# Patient Record
Sex: FEMALE | NOT HISPANIC OR LATINO | URBAN - METROPOLITAN AREA
[De-identification: names, ages, dates, MRNs, and addresses within clinical notes are randomized per-mention and may not be internally consistent; named-entity substitution may affect disease eponyms.]

---

## 2021-12-04 ENCOUNTER — EMERGENCY (EMERGENCY)
Facility: HOSPITAL | Age: 21
LOS: 1 days | Discharge: HOME | End: 2021-12-04
Attending: EMERGENCY MEDICINE | Admitting: EMERGENCY MEDICINE
Payer: COMMERCIAL

## 2021-12-04 VITALS
OXYGEN SATURATION: 97 % | WEIGHT: 114.64 LBS | HEIGHT: 64 IN | DIASTOLIC BLOOD PRESSURE: 82 MMHG | SYSTOLIC BLOOD PRESSURE: 134 MMHG | TEMPERATURE: 100 F | HEART RATE: 134 BPM | RESPIRATION RATE: 17 BRPM

## 2021-12-04 VITALS
SYSTOLIC BLOOD PRESSURE: 137 MMHG | DIASTOLIC BLOOD PRESSURE: 79 MMHG | HEART RATE: 104 BPM | OXYGEN SATURATION: 96 % | RESPIRATION RATE: 18 BRPM | TEMPERATURE: 99 F

## 2021-12-04 DIAGNOSIS — J10.00 INFLUENZA DUE TO OTHER IDENTIFIED INFLUENZA VIRUS WITH UNSPECIFIED TYPE OF PNEUMONIA: ICD-10-CM

## 2021-12-04 DIAGNOSIS — Z20.822 CONTACT WITH AND (SUSPECTED) EXPOSURE TO COVID-19: ICD-10-CM

## 2021-12-04 DIAGNOSIS — L51.9 ERYTHEMA MULTIFORME, UNSPECIFIED: ICD-10-CM

## 2021-12-04 DIAGNOSIS — L50.9 URTICARIA, UNSPECIFIED: ICD-10-CM

## 2021-12-04 LAB
ANION GAP SERPL CALC-SCNC: 8 MMOL/L — LOW (ref 9–16)
BASOPHILS # BLD AUTO: 0.02 K/UL — SIGNIFICANT CHANGE UP (ref 0–0.2)
BASOPHILS NFR BLD AUTO: 0.4 % — SIGNIFICANT CHANGE UP (ref 0–2)
BUN SERPL-MCNC: 19 MG/DL — SIGNIFICANT CHANGE UP (ref 7–23)
CALCIUM SERPL-MCNC: 9 MG/DL — SIGNIFICANT CHANGE UP (ref 8.5–10.5)
CHLORIDE SERPL-SCNC: 104 MMOL/L — SIGNIFICANT CHANGE UP (ref 96–108)
CO2 SERPL-SCNC: 24 MMOL/L — SIGNIFICANT CHANGE UP (ref 22–31)
CREAT SERPL-MCNC: 0.85 MG/DL — SIGNIFICANT CHANGE UP (ref 0.5–1.3)
EOSINOPHIL # BLD AUTO: 0.11 K/UL — SIGNIFICANT CHANGE UP (ref 0–0.5)
EOSINOPHIL NFR BLD AUTO: 2 % — SIGNIFICANT CHANGE UP (ref 0–6)
FLUAV H1 2009 PAND RNA SPEC QL NAA+PROBE: DETECTED
GLUCOSE SERPL-MCNC: 116 MG/DL — HIGH (ref 70–99)
HCG UR QL: NEGATIVE — SIGNIFICANT CHANGE UP
HCT VFR BLD CALC: 44.3 % — SIGNIFICANT CHANGE UP (ref 34.5–45)
HGB BLD-MCNC: 14.3 G/DL — SIGNIFICANT CHANGE UP (ref 11.5–15.5)
IMM GRANULOCYTES NFR BLD AUTO: 0.4 % — SIGNIFICANT CHANGE UP (ref 0–1.5)
LYMPHOCYTES # BLD AUTO: 0.68 K/UL — LOW (ref 1–3.3)
LYMPHOCYTES # BLD AUTO: 12.3 % — LOW (ref 13–44)
MCHC RBC-ENTMCNC: 29.1 PG — SIGNIFICANT CHANGE UP (ref 27–34)
MCHC RBC-ENTMCNC: 32.3 GM/DL — SIGNIFICANT CHANGE UP (ref 32–36)
MCV RBC AUTO: 90.2 FL — SIGNIFICANT CHANGE UP (ref 80–100)
MONOCYTES # BLD AUTO: 0.55 K/UL — SIGNIFICANT CHANGE UP (ref 0–0.9)
MONOCYTES NFR BLD AUTO: 10 % — SIGNIFICANT CHANGE UP (ref 2–14)
NEUTROPHILS # BLD AUTO: 4.13 K/UL — SIGNIFICANT CHANGE UP (ref 1.8–7.4)
NEUTROPHILS NFR BLD AUTO: 74.9 % — SIGNIFICANT CHANGE UP (ref 43–77)
NRBC # BLD: 0 /100 WBCS — SIGNIFICANT CHANGE UP (ref 0–0)
PLATELET # BLD AUTO: 123 K/UL — LOW (ref 150–400)
POTASSIUM SERPL-MCNC: 4.4 MMOL/L — SIGNIFICANT CHANGE UP (ref 3.5–5.3)
POTASSIUM SERPL-SCNC: 4.4 MMOL/L — SIGNIFICANT CHANGE UP (ref 3.5–5.3)
RAPID RVP RESULT: DETECTED
RBC # BLD: 4.91 M/UL — SIGNIFICANT CHANGE UP (ref 3.8–5.2)
RBC # FLD: 13 % — SIGNIFICANT CHANGE UP (ref 10.3–14.5)
SARS-COV-2 RNA SPEC QL NAA+PROBE: SIGNIFICANT CHANGE UP
SODIUM SERPL-SCNC: 136 MMOL/L — SIGNIFICANT CHANGE UP (ref 132–145)
WBC # BLD: 5.51 K/UL — SIGNIFICANT CHANGE UP (ref 3.8–10.5)
WBC # FLD AUTO: 5.51 K/UL — SIGNIFICANT CHANGE UP (ref 3.8–10.5)

## 2021-12-04 PROCEDURE — 99284 EMERGENCY DEPT VISIT MOD MDM: CPT

## 2021-12-04 RX ORDER — ACETAMINOPHEN 500 MG
975 TABLET ORAL ONCE
Refills: 0 | Status: COMPLETED | OUTPATIENT
Start: 2021-12-04 | End: 2021-12-04

## 2021-12-04 RX ORDER — DIPHENHYDRAMINE HCL 50 MG
50 CAPSULE ORAL ONCE
Refills: 0 | Status: COMPLETED | OUTPATIENT
Start: 2021-12-04 | End: 2021-12-04

## 2021-12-04 RX ORDER — DIPHENHYDRAMINE HCL 50 MG
1 CAPSULE ORAL
Qty: 15 | Refills: 0
Start: 2021-12-04 | End: 2021-12-08

## 2021-12-04 RX ORDER — VALACYCLOVIR 500 MG/1
1000 TABLET, FILM COATED ORAL ONCE
Refills: 0 | Status: COMPLETED | OUTPATIENT
Start: 2021-12-04 | End: 2021-12-04

## 2021-12-04 RX ORDER — VALACYCLOVIR 500 MG/1
1 TABLET, FILM COATED ORAL
Qty: 14 | Refills: 0
Start: 2021-12-04 | End: 2021-12-10

## 2021-12-04 RX ADMIN — VALACYCLOVIR 1000 MILLIGRAM(S): 500 TABLET, FILM COATED ORAL at 18:10

## 2021-12-04 RX ADMIN — Medication 125 MILLIGRAM(S): at 18:10

## 2021-12-04 RX ADMIN — Medication 75 MILLIGRAM(S): at 20:50

## 2021-12-04 RX ADMIN — Medication 975 MILLIGRAM(S): at 19:15

## 2021-12-04 RX ADMIN — Medication 50 MILLIGRAM(S): at 18:10

## 2021-12-04 NOTE — ED PROVIDER NOTE - PHYSICAL EXAMINATION
VITAL SIGNS: I have reviewed nursing notes and confirm.  CONSTITUTIONAL: Well-developed; well-nourished; in no acute distress.  SKIN: Skin exam is warm and dry, + serpentine/urticarial maculopapular erythematous blanching rash over most of pt's body w/out vesicles, ulcerations, scaling or purpura.   HEAD: Normocephalic; atraumatic.  EYES: PERRL, EOM intact; conjunctiva and sclera clear.  ENT: No nasal discharge; airway clear, no mucosal lesions/erythema/edema  NECK: Supple; non tender.  CARD: S1, S2 normal; no murmurs, gallops, or rubs. Regular rate and rhythm.  RESP: Unlabored. No wheezes, rales or rhonchi.  ABD: soft; non-distended; non-tender  EXT: Normal ROM. No cyanosis or edema. Non-ttp all ext, distal pulses intact  LYMPH: No acute cervical adenopathy.  NEURO: Alert, oriented. Grossly unremarkable.  PSYCH: Cooperative, appropriate.

## 2021-12-04 NOTE — ED PROVIDER NOTE - OBJECTIVE STATEMENT
20 y/o F w/no sig pmhx, p/w urticarial/hives rash on/off for several weeks. No mucosal involvement. Seen by derm, completed several days of prednisone and antihistamine treatment with improvement of sx temporarily, rash began again about 2-3 days after finishing meds. Pt also received her flu shot 2 days ago and has felt feverish with myalgias for the past 24hrs, taking tylenol w/good effect. No fever otherwise throughout rash eruption. No new drugs, clothes, pet exposure, or other identified antigen exposure. No throat swelling, difficulty breathing/SOB. No abd pain or n/v.

## 2021-12-04 NOTE — ED PROVIDER NOTE - NSFOLLOWUPINSTRUCTIONS_ED_ALL_ED_FT
Urticaria    WHAT YOU NEED TO KNOW:    Urticaria is also called hives. Hives can change size and shape, and appear anywhere on your skin. They can be mild or severe and last from a few minutes to a few days. Hives may be a sign of a severe allergic reaction called anaphylaxis that needs immediate treatment. Urticaria that lasts longer than 6 weeks may be a chronic condition that needs long-term treatment.    Hives         DISCHARGE INSTRUCTIONS:    Call your local emergency number (911 in the ) for signs or symptoms of anaphylaxis, such as trouble breathing, swelling in your mouth or throat, or wheezing. You may also have itching, a rash, or feel like you are going to faint.    Return to the emergency department if:   •Your heart is beating faster than it normally does.      •You have cramping or severe pain in your abdomen.      Call your doctor if:   •You have a fever.      •Your skin still itches 24 hours after you take your medicine.      •You still have hives after 7 days.      •Your joints are painful and swollen.      •You have questions or concerns about your condition or care.      Medicines: You may need any of the following:  •Epinephrine is used to treat severe allergic reactions such as anaphylaxis.      •Antihistamines decrease mild symptoms such as itching or a rash.      •Steroids decrease redness, pain, and swelling.      •Take your medicine as directed. Contact your healthcare provider if you think your medicine is not helping or if you have side effects. Tell him of her if you are allergic to any medicine. Keep a list of the medicines, vitamins, and herbs you take. Include the amounts, and when and why you take them. Bring the list or the pill bottles to follow-up visits. Carry your medicine list with you in case of an emergency.      Steps to take for signs or symptoms of anaphylaxis:   •Immediately give 1 shot of epinephrine only into the outer thigh muscle.  How to Give An Epinephrine Shot Adult           •Leave the shot in place as directed. Your healthcare provider may recommend you leave it in place for up to 10 seconds before you remove it. This helps make sure all of the epinephrine is delivered.      •Call 911 and go to the emergency department, even if the shot improved symptoms. Do not drive yourself. Bring the used epinephrine shot with you.      Safety precautions to take if you are at risk for anaphylaxis:   •Keep 2 shots of epinephrine with you at all times. You may need a second shot, because epinephrine only works for about 20 minutes and symptoms may return. Your healthcare provider can show you and family members how to give the shot. Check the expiration date every month and replace it before it expires.      •Create an action plan. Your healthcare provider can help you create a written plan that explains the allergy and an emergency plan to treat a reaction. The plan explains when to give a second epinephrine shot if symptoms return or do not improve after the first. Give copies of the action plan and emergency instructions to family members, work and school staff, and  providers. Show them how to give a shot of epinephrine.      •Be careful when you exercise. If you have had exercise-induced anaphylaxis, do not exercise right after you eat. Stop exercising right away if you start to develop any signs or symptoms of anaphylaxis. You may first feel tired, warm, or have itchy skin. Hives, swelling, and severe breathing problems may develop if you continue to exercise.      •Carry medical alert identification. Wear medical alert jewelry or carry a card that explains the allergy. Ask your healthcare provider where to get these items.   Medical Alert Jewelry           •Keep a record of triggers and symptoms. Record everything you eat, drink, or apply to your skin for 3 weeks. Include stressful events and what you were doing right before your hives started. Bring the record with you to follow-up visits with your healthcare provider.      Manage urticaria:   •Cool your skin. This may help decrease itching. Apply a cool pack to your hives. Dip a hand towel in cool water, wring it out, and place it on your hives. You may also soak your skin in a cool oatmeal bath.      •Do not rub your hives. This can irritate your skin and cause more hives.      •Wear loose clothing. Tight clothes may irritate your skin and cause more hives.      •Manage stress. Stress may trigger hives, or make them worse. Learn new ways to relax, such as deep breathing.       Follow up with your healthcare provider as directed: Write down your questions so you remember to ask them during your visits. stay well hydrated - very important     take all medications as directed without fail     WHAT YOU NEED TO KNOW:    Urticaria is also called hives. Hives can change size and shape, and appear anywhere on your skin. They can be mild or severe and last from a few minutes to a few days. Hives may be a sign of a severe allergic reaction called anaphylaxis that needs immediate treatment. Urticaria that lasts longer than 6 weeks may be a chronic condition that needs long-term treatment.    Hives         DISCHARGE INSTRUCTIONS:    Call your local emergency number (911 in the ) for signs or symptoms of anaphylaxis, such as trouble breathing, swelling in your mouth or throat, or wheezing. You may also have itching, a rash, or feel like you are going to faint.    Return to the emergency department if:   •Your heart is beating faster than it normally does.      •You have cramping or severe pain in your abdomen.      Call your doctor if:   •You have a fever.      •Your skin still itches 24 hours after you take your medicine.      •You still have hives after 7 days.      •Your joints are painful and swollen.      •You have questions or concerns about your condition or care.      Medicines: You may need any of the following:  •Epinephrine is used to treat severe allergic reactions such as anaphylaxis.      •Antihistamines decrease mild symptoms such as itching or a rash.      •Steroids decrease redness, pain, and swelling.      •Take your medicine as directed. Contact your healthcare provider if you think your medicine is not helping or if you have side effects. Tell him of her if you are allergic to any medicine. Keep a list of the medicines, vitamins, and herbs you take. Include the amounts, and when and why you take them. Bring the list or the pill bottles to follow-up visits. Carry your medicine list with you in case of an emergency.      Steps to take for signs or symptoms of anaphylaxis:   •Immediately give 1 shot of epinephrine only into the outer thigh muscle.  How to Give An Epinephrine Shot Adult           •Leave the shot in place as directed. Your healthcare provider may recommend you leave it in place for up to 10 seconds before you remove it. This helps make sure all of the epinephrine is delivered.      •Call 911 and go to the emergency department, even if the shot improved symptoms. Do not drive yourself. Bring the used epinephrine shot with you.      Safety precautions to take if you are at risk for anaphylaxis:   •Keep 2 shots of epinephrine with you at all times. You may need a second shot, because epinephrine only works for about 20 minutes and symptoms may return. Your healthcare provider can show you and family members how to give the shot. Check the expiration date every month and replace it before it expires.      •Create an action plan. Your healthcare provider can help you create a written plan that explains the allergy and an emergency plan to treat a reaction. The plan explains when to give a second epinephrine shot if symptoms return or do not improve after the first. Give copies of the action plan and emergency instructions to family members, work and school staff, and  providers. Show them how to give a shot of epinephrine.      •Be careful when you exercise. If you have had exercise-induced anaphylaxis, do not exercise right after you eat. Stop exercising right away if you start to develop any signs or symptoms of anaphylaxis. You may first feel tired, warm, or have itchy skin. Hives, swelling, and severe breathing problems may develop if you continue to exercise.      •Carry medical alert identification. Wear medical alert jewelry or carry a card that explains the allergy. Ask your healthcare provider where to get these items.   Medical Alert Jewelry           •Keep a record of triggers and symptoms. Record everything you eat, drink, or apply to your skin for 3 weeks. Include stressful events and what you were doing right before your hives started. Bring the record with you to follow-up visits with your healthcare provider.      Manage urticaria:   •Cool your skin. This may help decrease itching. Apply a cool pack to your hives. Dip a hand towel in cool water, wring it out, and place it on your hives. You may also soak your skin in a cool oatmeal bath.      •Do not rub your hives. This can irritate your skin and cause more hives.      •Wear loose clothing. Tight clothes may irritate your skin and cause more hives.      •Manage stress. Stress may trigger hives, or make them worse. Learn new ways to relax, such as deep breathing.       Follow up with your healthcare provider as directed: Write down your questions so you remember to ask them during your visits.

## 2021-12-04 NOTE — ED PROVIDER NOTE - CARE PLAN
1 Principal Discharge DX:	Erythema multiforme   Principal Discharge DX:	Erythema multiforme  Secondary Diagnosis:	Pneumonia due to influenza A virus

## 2021-12-04 NOTE — ED PROVIDER NOTE - CLINICAL SUMMARY MEDICAL DECISION MAKING FREE TEXT BOX
Rash does not appear cellulitic. Pt reports that it is migratory an waxes/wanes, likely allergic, possibly related to HSV, will treat with steroids, antihistamines, and valtrex. Pt has appropriate f/u with derm. No mucosal involvement. VSS at discharge. Today's fever likely related to flu shot. Given return precautions and anticipatory guidance. Rash does not appear cellulitic. Pt reports that it is migratory an waxes/wanes, likely allergic, possibly related to HSV, will treat with steroids, antihistamines, and valtrex. Pt has appropriate f/u with derm. No mucosal involvement. VSS at discharge. Today's fever likely related to flu shot. Given return precautions and anticipatory guidance.    patient informed of inf a + - fever body cages started less than 24 hous ago -  I discussed tamiflu with patient for flu sx - pro and cons - pt stated desire to take tamiflu.   will give first dos in ER and send prescription to [pharmacy    HR at time of dc 95   alert and cooperative fluent and appropriate speech

## 2021-12-04 NOTE — ED ADULT NURSE NOTE - OBJECTIVE STATEMENT
Pt w/o significant PMH presents c/o 3/10 mildly pruritic rash to entire body, waxing and waning for several weeks.  Pt has seen dermatologist for same issue and been prescribed steroids w/ limited relief.  Pt denies cough, throat itching, SOB.  Pt also endorses low-grade fever 2ndary to rcving flu vax yesterday, but endorses responds well to acetaminophen.  Pt is pending labs and meds.

## 2021-12-04 NOTE — ED ADULT NURSE NOTE - CHIEF COMPLAINT QUOTE
unresolved hives [id number] went to dermatologist prescribed ceterzine and prednisone. completed prednisone which slighty improved urticaria, yet hives remain on face, back/chest bue and ble. +pruritic +redness no resp distress no dyspnea. speaking in long full sentences aox3 verbal. additionally received flu vaccine 2days pta c/o chills, tactile fever. denies any change in diet/hygiene no recent travel. lmp12/2/21

## 2021-12-04 NOTE — ED ADULT TRIAGE NOTE - CHIEF COMPLAINT QUOTE
unresolved hives [id number] went to dermatologist prescribed ceterzine and prednisone. completed prednisone which slighty improved urticaria, yet hives remain on face, back/chest bue and ble. +pruritic +redness no resp distress no dyspnea. speaking in long full sentences aox3 verbal. additionally received flu vaccine 2days pta c/o chills, tactile fever. denies any change in diet/hygiene no recent travel. lmp12/2/21

## 2021-12-04 NOTE — ED PROVIDER NOTE - PATIENT PORTAL LINK FT
You can access the FollowMyHealth Patient Portal offered by [facility] by registering at the following website: [email or web address]. By joining [employer]’s FollowMyHealth portal, you will also be able to view your health information using other applications (apps) compatible with our system.